# Patient Record
(demographics unavailable — no encounter records)

---

## 2024-12-16 NOTE — END OF VISIT
[FreeTextEntry3] : I personally saw and examined  the patient in detail.  I spoke to CUAUHTEMOC Hernandez regarding the assessment and plan of care. I performed the procedures and relevant physical exam.  I have reviewed the above assessment and plan of care and I agree.  I have made changes to the body of the note wherever necessary and appropriate

## 2024-12-16 NOTE — HISTORY OF PRESENT ILLNESS
[de-identified] : 33 year old male presents for evaluation of 2 weeks of left ear pain. States was started on amoxicillin for ear infection, has 4 days left. states having trouble hearing out of left ear. States it feels full and clogged. Cannot pop left ear. no Vertigo, tinnitus, drainage or facial weakness.   Also with stuffy nose and pressure at face at her cheeks. States has improved since taking antibiotic. Patient is also 35 weeks pregnant.

## 2024-12-16 NOTE — HISTORY OF PRESENT ILLNESS
[de-identified] : 33 year old male presents for evaluation of 2 weeks of left ear pain. States was started on amoxicillin for ear infection, has 4 days left. states having trouble hearing out of left ear. States it feels full and clogged. Cannot pop left ear. no Vertigo, tinnitus, drainage or facial weakness.   Also with stuffy nose and pressure at face at her cheeks. States has improved since taking antibiotic. Patient is also 35 weeks pregnant.

## 2024-12-16 NOTE — PROCEDURE
[FreeTextEntry6] : Procedure performed: Nasal Endoscopy- Diagnostic Pre-op indication(s): nasal congestion Post-op indication(s): nasal congestion  Verbal and/or written consent obtained from patient Anterior rhinoscopy insufficient to account for symptoms Scope #: 3,  flexible fiber optic telescope  The scope was introduced in the nasal passage between the middle and inferior turbinates to exam the inferior portion of the middle meatus and the fontanelle, as well as the maxillary ostia.  Next, the scope was passed medically and posteriorly to the middle turbinates to examine the sphenoethmoid recess and the superior turbinate region. Upon visualization the finders are as follows: Nasal Septum: sigmoidal septal deviation Bilateral - Mucosa: boggy turbinates, Mucous: scant, Polyp: not seen, Inferior Turbinate: boggy, Middle Turbinate: normal, Superior Turbinate: normal, Inferior Meatus: narrow, Middle Meatus: narrow, Super Meatus:normal, Sphenoethmoidal Recess: clear  purulence bilateral OMC

## 2024-12-16 NOTE — ASSESSMENT
[FreeTextEntry1] : 33 year old female 35 weeks pregnant presents for evaluation of left ear pain. On exam, thin fluid at left TM.  - audiogram performed and reviewed 12/16/24. left CHL c/w fluid  - flonase - continue amoxicillin - improving  will see if resolves  Also with nasal congestion. On exam, thick secretions and purulence from OMC bilaterally.  - Will start Flonase. A topical steroid reduce mucosal swelling, illustrated appropriate use and how to reduce the risk of bleeding  - Nasal irrigation and showed how to use it to maximize effectiveness  - follow up if persists sx seem to be imrpoving on amox, will see if resolves, if do not will consdier widening coverage